# Patient Record
Sex: MALE | Race: OTHER | Employment: FULL TIME | URBAN - METROPOLITAN AREA
[De-identification: names, ages, dates, MRNs, and addresses within clinical notes are randomized per-mention and may not be internally consistent; named-entity substitution may affect disease eponyms.]

---

## 2024-05-02 ENCOUNTER — HOSPITAL ENCOUNTER (EMERGENCY)
Facility: HOSPITAL | Age: 39
Discharge: HOME | End: 2024-05-02
Attending: EMERGENCY MEDICINE
Payer: OTHER MISCELLANEOUS

## 2024-05-02 VITALS
TEMPERATURE: 99 F | DIASTOLIC BLOOD PRESSURE: 87 MMHG | HEART RATE: 77 BPM | OXYGEN SATURATION: 99 % | RESPIRATION RATE: 18 BRPM | SYSTOLIC BLOOD PRESSURE: 145 MMHG

## 2024-05-02 DIAGNOSIS — S05.92XA LEFT EYE INJURY, INITIAL ENCOUNTER: Primary | ICD-10-CM

## 2024-05-02 PROCEDURE — 99284 EMERGENCY DEPT VISIT MOD MDM: CPT | Performed by: EMERGENCY MEDICINE

## 2024-05-02 PROCEDURE — 99283 EMERGENCY DEPT VISIT LOW MDM: CPT

## 2024-05-02 RX ORDER — TOBRAMYCIN 3 MG/ML
1 SOLUTION/ DROPS OPHTHALMIC
Qty: 1.8 ML | Refills: 0 | Status: SHIPPED | OUTPATIENT
Start: 2024-05-02 | End: 2024-05-09

## 2024-05-02 RX ORDER — KETOROLAC TROMETHAMINE 5 MG/ML
1 SOLUTION OPHTHALMIC EVERY 6 HOURS PRN
Qty: 3 ML | Refills: 0 | Status: SHIPPED | OUTPATIENT
Start: 2024-05-02

## 2024-05-02 RX ORDER — TETRACAINE HYDROCHLORIDE 5 MG/ML
1 SOLUTION OPHTHALMIC ONCE
Status: COMPLETED | OUTPATIENT
Start: 2024-05-02 | End: 2024-05-02

## 2024-05-02 RX ADMIN — TETRACAINE HYDROCHLORIDE 1 DROP: 5 SOLUTION OPHTHALMIC at 22:55

## 2024-05-02 RX ADMIN — FLUORESCEIN SODIUM 1 STRIP: 1 STRIP OPHTHALMIC at 22:55

## 2024-05-03 NOTE — ED PROVIDER NOTES
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 C/o blurry vision      Patient is a 39-year-old male, no significant past medical history presenting today for evaluation of an eye injury.  Patient states that he was at work cutting metal when he felt something go into his left eye.  Patient was not wearing safety goggles at that time.  Patient states that since that time, he has had ongoing eye pain as well as photophobia.  Patient denies any changes in visual acuity, pain with extraocular eye movements, or otherwise symptoms.  Patient states that when he blinks, it feels like there is something in his eye.  Patient does not wear contacts, denies any previous injury to the eye.       used: Yes        None       History reviewed. No pertinent past medical history.    History reviewed. No pertinent surgical history.    History reviewed. No pertinent family history.  I have reviewed and agree with the history as documented.    E-Cigarette/Vaping     E-Cigarette/Vaping Substances     Social History     Tobacco Use    Smoking status: Never    Smokeless tobacco: Never   Substance Use Topics    Alcohol use: Never    Drug use: Never        Review of Systems    Physical Exam  ED Triage Vitals   Temperature Pulse Respirations Blood Pressure SpO2   05/02/24 2229 05/02/24 2229 05/02/24 2229 05/02/24 2231 05/02/24 2229   99 °F (37.2 °C) 77 18 145/87 99 %      Temp Source Heart Rate Source Patient Position - Orthostatic VS BP Location FiO2 (%)   05/02/24 2229 05/02/24 2229 -- -- --   Temporal Monitor         Pain Score       05/02/24 2229       10 - Worst Possible Pain             Orthostatic Vital Signs  Vitals:    05/02/24 2229 05/02/24 2231   BP:  145/87   Pulse: 77        Physical Exam  Vitals and nursing note reviewed.   Constitutional:       General: He is not in acute distress.     Appearance: He  is not ill-appearing or toxic-appearing.   HENT:      Head: Normocephalic and atraumatic.      Nose: No congestion.   Eyes:      General: Lids are normal. Lids are everted, no foreign bodies appreciated. Vision grossly intact.         Left eye: No foreign body or discharge.      Intraocular pressure: Left eye pressure is 18 mmHg. Measurements were taken using a handheld tonometer.     Extraocular Movements: Extraocular movements intact.      Right eye: Normal extraocular motion.      Left eye: Normal extraocular motion.      Conjunctiva/sclera:      Left eye: Left conjunctiva is injected. No hemorrhage.     Pupils: Pupils are equal, round, and reactive to light.      Right eye: Pupil is round. No corneal abrasion or fluorescein uptake. Mónica exam negative.      Left eye: Pupil is round. No corneal abrasion or fluorescein uptake. Mónica exam negative.     Comments: Visual acuity 20/20 bilaterally   Cardiovascular:      Rate and Rhythm: Normal rate.   Pulmonary:      Effort: Pulmonary effort is normal. No respiratory distress.   Musculoskeletal:         General: Normal range of motion.   Skin:     General: Skin is warm and dry.   Neurological:      General: No focal deficit present.      Mental Status: He is alert.         ED Medications  Medications   tetracaine 0.5 % ophthalmic solution 1 drop (1 drop Left Eye Given 5/2/24 4868)   fluorescein sodium sterile ophthalmic strip 1 strip (1 strip Left Eye Given 5/2/24 2255)       Diagnostic Studies  Results Reviewed       None                   No orders to display         Procedures  Procedures      ED Course  ED Course as of 05/03/24 0014   Thu May 02, 2024   2245 Patient seen and evaluated by me  DDx: Concern for corneal abrasion, retained foreign body, too early for rust ring to perform.  No physical exam findings concerning for globe rupture or intraocular injury  Workup and plan: erythromycin ointment, toradol drops. F/u with ophthalmology   1162 Patient discharged  at this time, given return precautions and follow-up information.  Patient reports understanding, all questions answered.                               SBIRT 22yo+      Flowsheet Row Most Recent Value   Initial Alcohol Screen: US AUDIT-C     1. How often do you have a drink containing alcohol? 0 Filed at: 05/02/2024 2243   2. How many drinks containing alcohol do you have on a typical day you are drinking?  0 Filed at: 05/02/2024 2243   3a. Male UNDER 65: How often do you have five or more drinks on one occasion? 0 Filed at: 05/02/2024 2243   3b. FEMALE Any Age, or MALE 65+: How often do you have 4 or more drinks on one occassion? 0 Filed at: 05/02/2024 2243   Audit-C Score 0 Filed at: 05/02/2024 2243   RAINER: How many times in the past year have you...    Used an illegal drug or used a prescription medication for non-medical reasons? Never Filed at: 05/02/2024 2243                  Medical Decision Making  Please see ED course above regarding details of the MDM    Risk  Prescription drug management.          Disposition  Final diagnoses:   Left eye injury, initial encounter     Time reflects when diagnosis was documented in both MDM as applicable and the Disposition within this note       Time User Action Codes Description Comment    5/2/2024 11:12 PM Gaby Orellana Add [S05.92XA] Left eye injury, initial encounter           ED Disposition       ED Disposition   Discharge    Condition   Stable    Date/Time   Thu May 2, 2024 2312    Comment   Angela Lentz discharge to home/self care.                   Follow-up Information       Follow up With Specialties Details Why Contact Info    [facility]  In 1 week  [address] [address] [phone]            Discharge Medication List as of 5/2/2024 11:18 PM        START taking these medications    Details   ketorolac (ACULAR) 0.5 % ophthalmic solution Administer 1 drop to both eyes every 6 (six) hours as needed (as needed  for pain), Starting Thu 5/2/2024, Print      tobramycin (TOBREX) 0.3 % SOLN Administer 1 drop into the left eye every 4 (four) hours while awake for 7 days, Starting Thu 5/2/2024, Until Thu 5/9/2024, Print           No discharge procedures on file.    PDMP Review       None             ED Provider  Attending physically available and evaluated Angela Lentz. I managed the patient along with the ED Attending.    Electronically Signed by           Gaby Orellana MD  05/03/24 0014

## 2024-05-03 NOTE — ED ATTENDING ATTESTATION
5/2/2024  I, Shawn David MD, saw and evaluated the patient. I have discussed the patient with the resident/non-physician practitioner and agree with the resident's/non-physician practitioner's findings, Plan of Care, and MDM as documented in the resident's/non-physician practitioner's note, except where noted. All available labs and Radiology studies were reviewed.  I was present for key portions of any procedure(s) performed by the resident/non-physician practitioner and I was immediately available to provide assistance.       At this point I agree with the current assessment done in the Emergency Department.  I have conducted an independent evaluation of this patient a history and physical is as follows:    39-year-old male presenting with left eye pain.  Patient was grinding metal and felt like something went into his eye.  Since then he has been having eye pain and foreign body sensation.  No vision change.  On exam patient awake and alert, uncomfortable appearing.  Pupils equal and reactive.  Extraocular movements intact.  Normal conjunctiva.  No foreign body was appreciated including with eversion of eyelids.  Tetracaine administered with full relief of pain.  Fluorescein applied and slit-lamp exam done.  Anterior chamber normal without hyphema, cell and flare, hypopyon.  No corneal ulceration noted.  No corneal abrasion clearly seen.  Again no foreign body was visualized.  IOP was 18.  Will treat patient empirically for possible small corneal abrasion and refer to ophthalmology.    ED Course         Critical Care Time  Procedures

## 2024-05-03 NOTE — DISCHARGE INSTRUCTIONS
You have been seen in the ED for evaluation of an eye injury. Please follow up with an eye doctor regarding this. We have listed a referral below.  Please use tobramycin drops as prescribed every 4 hours for 7 days.  Please use toradol drops as needed for pain.  Please return should condition worsen or new symptoms arise.